# Patient Record
Sex: FEMALE | Race: WHITE | NOT HISPANIC OR LATINO | Employment: FULL TIME | URBAN - METROPOLITAN AREA
[De-identification: names, ages, dates, MRNs, and addresses within clinical notes are randomized per-mention and may not be internally consistent; named-entity substitution may affect disease eponyms.]

---

## 2020-08-20 ENCOUNTER — OFFICE VISIT (OUTPATIENT)
Dept: OBGYN CLINIC | Facility: MEDICAL CENTER | Age: 50
End: 2020-08-20
Payer: COMMERCIAL

## 2020-08-20 VITALS
DIASTOLIC BLOOD PRESSURE: 85 MMHG | HEIGHT: 64 IN | BODY MASS INDEX: 38.76 KG/M2 | WEIGHT: 227 LBS | HEART RATE: 51 BPM | SYSTOLIC BLOOD PRESSURE: 125 MMHG

## 2020-08-20 DIAGNOSIS — M25.561 ACUTE PAIN OF RIGHT KNEE: Primary | ICD-10-CM

## 2020-08-20 DIAGNOSIS — M76.51 PATELLAR TENDINITIS, RIGHT KNEE: ICD-10-CM

## 2020-08-20 PROBLEM — G89.29 CHRONIC PAIN OF RIGHT KNEE: Status: ACTIVE | Noted: 2020-08-20

## 2020-08-20 PROCEDURE — 99203 OFFICE O/P NEW LOW 30 MIN: CPT | Performed by: PHYSICAL MEDICINE & REHABILITATION

## 2020-08-20 RX ORDER — BENAZEPRIL HYDROCHLORIDE 40 MG/1
40 TABLET, FILM COATED ORAL DAILY
COMMUNITY

## 2020-08-20 RX ORDER — SERTRALINE HYDROCHLORIDE 100 MG/1
100 TABLET, FILM COATED ORAL DAILY
COMMUNITY

## 2020-08-20 RX ORDER — MULTIVITAMIN
1 TABLET ORAL DAILY
COMMUNITY

## 2020-08-20 NOTE — PATIENT INSTRUCTIONS
You can obtain and use Voltaren (diclofenac) cream for your discomfort  This is now available over the counter at [employer] and online at [place]  You can use this up to four times per day  It is best to wash the area with water and then pat dry  The cream absorbs better after this  Be careful not to let any pets or children get in contact with the cream as it is a medication  If you develop a skin reaction, stop using the cream     You will be starting physical therapy  It is important to do a home exercise program as you go through PT to speed up your recovery  Rehab addresses the problems that are causing your pain, instead of covering them up, as some other treatment options do  If your pain worsens/does not improve with rehab, we can see you back earlier than planned  Please call our office if that is the case  Otherwise, we will see you on your follow up as scheduled

## 2020-08-20 NOTE — PROGRESS NOTES
1  Acute pain of right knee     2  Patellar tendinitis, right knee       No orders of the defined types were placed in this encounter  Imaging Studies (I personally reviewed images in PACS and report):  Right knee x-rays most recent to this encounter reviewed  These images show medial joint space narrowing with subchondral sclerosis  There is also osteophytosis seen on lateral view in the posterior knee which likely represents a flabella and osteophytes versus an old avulsion injury  Impression:  Right knee pain likely secondary to patellar tendinitis  The patient has full range of motion with extension but limited slightly with flexion due to discomfort  She does not have an extensor lag at all  There is no sunrise view on her x-rays but no fracture of the patella seen  She does have radio-opacities seen in the posterior aspect of her knee on the lateral view and on 1 of the oblique views  This is most likely a flabella along with ossicles versus old avulsion injuries  Additionally, she does not have any posterior knee symptoms or pain on palpation  We provided her with a counterforce brace for the patellar tendon  She will also obtain Voltaren gel over-the-counter  She will start physical therapy  I will see her back in 3 weeks or earlier to reassess  If no improvement, advanced imaging would be warranted  Return in about 3 weeks (around 9/10/2020)  HPI:  Janelle العلي is a 45 y o  female  who presents for evaluation of   Chief Complaint   Patient presents with    Right Knee - Pain       Onset/Mechanism: Pain started two weeks ago while she was standing she felt like a rubber band ripped  Location: Pain is along the top of the knee and radiates downwards  Radiation: As above  Quality: Aching sensation and sharp if standing very long  Provocative: Bending the knee and standing  Severity: Since the pain started, it has been getting worse    Associated Symptoms: She thinks she might have had swelling but not sure  Treatment so far: No treatment  Review of Systems   Constitutional: Positive for activity change  Negative for fever  HENT: Negative for sore throat  Eyes: Negative for visual disturbance  Respiratory: Negative for shortness of breath  Cardiovascular: Negative for chest pain  Gastrointestinal: Negative for abdominal pain  Endocrine: Negative for polydipsia  Genitourinary: Negative for difficulty urinating  Musculoskeletal: Positive for arthralgias  Skin: Negative for rash  Allergic/Immunologic: Negative for immunocompromised state  Neurological: Negative for numbness  Hematological: Does not bruise/bleed easily  Psychiatric/Behavioral: Negative for confusion  Following history reviewed and updated:  Past Medical History:   Diagnosis Date    Hypertension      Past Surgical History:   Procedure Laterality Date    BARIATRIC SURGERY       Social History   Social History     Substance and Sexual Activity   Alcohol Use Yes    Comment: Rarely     Social History     Substance and Sexual Activity   Drug Use Never     Social History     Tobacco Use   Smoking Status Never Smoker   Smokeless Tobacco Never Used     Family History   Problem Relation Age of Onset    Diabetes Brother      No Known Allergies     Constitutional:  /85   Pulse (!) 51   Ht 5' 4" (1 626 m)   Wt 103 kg (227 lb)   BMI 38 96 kg/m²    General: NAD  Eyes: Anicteric sclerae  Neck: Supple  Lungs: Unlabored breathing  Cardiovascular: No lower extremity edema  Skin: Intact without erythema  Neurologic: Sensation intact to light touch  Psychiatric: Mood and affect are appropriate  Right Knee Exam     Muscle Strength   The patient has normal right knee strength  Tenderness   The patient is experiencing tenderness in the patellar tendon  Range of Motion   Extension: normal   Flexion: abnormal Right knee flexion: Pain along the anterior knee at end range of flexion  Tests   Varus: negative Valgus: negative  Drawer:  Anterior - negative    Posterior - negative    Other   Erythema: absent  Scars: absent  Sensation: normal  Pulse: present  Swelling: none  Effusion: no effusion present             Procedures - none for this visit  Portions of the record may have been created with voice recognition software  Occasional wrong word or "sound a like" substitutions may have occurred due to the inherent limitations of voice recognition software  Read the chart carefully and recognize, using context, where substitutions have occurred

## 2020-08-31 ENCOUNTER — EVALUATION (OUTPATIENT)
Dept: PHYSICAL THERAPY | Facility: MEDICAL CENTER | Age: 50
End: 2020-08-31
Payer: COMMERCIAL

## 2020-08-31 DIAGNOSIS — M76.51 PATELLAR TENDINITIS, RIGHT KNEE: ICD-10-CM

## 2020-08-31 DIAGNOSIS — G89.29 CHRONIC PAIN OF RIGHT KNEE: Primary | ICD-10-CM

## 2020-08-31 DIAGNOSIS — M25.561 CHRONIC PAIN OF RIGHT KNEE: Primary | ICD-10-CM

## 2020-08-31 PROCEDURE — 97161 PT EVAL LOW COMPLEX 20 MIN: CPT

## 2020-08-31 NOTE — PROGRESS NOTES
PT Evaluation     Today's date: 2020  Patient name: Amauri Graham  : 1970  MRN: [id number]  Referring provider: Clayton Azar DO  Dx:   Encounter Diagnosis     ICD-10-CM    1  Chronic pain of right knee  M25 561 PT plan of care cert/re-cert    T87 26 PT plan of care cert/re-cert   2  Patellar tendinitis, right knee  M76 51 PT plan of care cert/re-cert     PT plan of care cert/re-cert       Start Time: 1438  Stop Time: 1520  Total time in clinic (min): 42 minutes    Assessment  Assessment details: Amauri Graham is a 44 y/o female who presents to OP PT with a referral from Dr Rabia Larose with a medical diagnosis of acute pain of R knee, patellar tendinitis  Upon examination patient presents with painful ROM of the R knee, wih limited R knee flexion  Pt presents with decreased B LE strength with deficits being greater in R vs L  Patient also presents with TTP of R patella ligament  Patient has decreased balance and functional mobility as well as increased pain at this time  Previously mentioned deficits have impaired patients ability to perform ADLs, recreational activities and work duties  Pt was educated on examination findings, diagnosis, prognosis, role of skilled PT services in the rehabilitation process and importance of HEP compliance for long term success  Pt states understanding and consents to skilled PT services  Pt is a good candidate for skilled pt services  Positive prognostic indicators include desire to improve and active job  Negative prognostic indicators include PMH and BMI at this time  Pt would benefit from skilled PT services to address functional deficits and return to PLOF    Impairments: abnormal gait, abnormal muscle firing, abnormal or restricted ROM, abnormal movement, activity intolerance, impaired balance, impaired physical strength, lacks appropriate home exercise program and pain with function    Symptom irritability: moderateBarriers to therapy: copay  Understanding of Dx/Px/POC: good   Prognosis: good    Goals  STG     1  Patient will demonstrate increased hip abduction strength to 4+/5 to improve gait mechanics in midstance and pre-swing stages    2  Patient will be able to perform deep squat and return to standing without pain to fulfill requirements as   3     Patient will report a 2-3 point decrease on NPRS for improved activity tolerance, performance of ADLs and recreational activities  LTG    1  Patient will be independent with HEP for continued progress  2      Patient will demonstrate 5/5 in all R Knee MMT to improve tolerance for performing work and recreational activities and allow for return to prolonged community ambulation    3  Patient will attain FOTO score of 57 or greater at time of discharge  4      Patient will return to PLOF in performance of all ADLs, recreational activities and work duties  Plan  Patient would benefit from: skilled physical therapy  Planned modality interventions: cryotherapy and TENS  Planned therapy interventions: joint mobilization, manual therapy, massage, ADL training, balance, neuromuscular re-education, patient education, strengthening, stretching, therapeutic activities, therapeutic exercise, flexibility, functional ROM exercises, gait training, graded exercise, home exercise program and Terrell taping  Frequency: 2x week  Plan of Care beginning date: 8/31/2020  Plan of Care expiration date: 10/12/2020  Treatment plan discussed with: patient        Subjective Evaluation    History of Present Illness  Mechanism of injury: Subjective: Patient states she has had a chronic history of B knee pain  She states since bariatric surgery knee pain  Patient states a few weeks ago at work, she turned to change directions, it "felt like a rubber band snapped in my knee " It has not changed in symptoms  Pt states foot was planted and she attempted to pivot when she felt the pain   Pt states painful clicking since initial injury  Pt states clicking occurs when moving from flexed to extended positions  Pt states pain feels like it is "deep in the joint "    Pain  Location: R knee,   Type: ache-after sitting, random-sharp shooting  Current: 5/10  Best: 5/10  Worst: 10/10  Alleviates: ice, voltaren  Aggravates: sitting for greater than 30 minutes, standing and walking    Occupation:     Imaging: outside imaging    PMH: bariatric surgery    Previous Surgeries: bariatric surgery, kidney stone    Previous Physical Activity: "work, 10-15k steps"     Current Medications:  addiiton of voltaren gel(1-2x a day)    MATTHEW: pivot/twisting    Surgery Date: n/a     MD:Dr Cl Nuñez    Patient Goals: pain to stop               Objective     Palpation     Right   Tenderness of the lateral gastrocnemius, medial gastrocnemius, rectus femoris, vastus lateralis and vastus medialis  Tenderness     Right Knee   Tenderness in the inferior fat pad, inferior patella, lateral joint line, medial joint line, patellar tendon, quadriceps tendon and superior patella  Active Range of Motion   Left Knee   Normal active range of motion    Right Knee   Flexion: 118 degrees with pain  Extension: 0 degrees     Passive Range of Motion     Right Knee   Flexion: 125 degrees with pain  Extension: 0 degrees     Mobility   Patellar Mobility:     Right Knee   Hypomobile: medial, lateral, superior and inferior     Strength/Myotome Testing     Left Hip   Planes of Motion   Flexion: 4  Extension: 4  Abduction: 4  External rotation: 4  Internal rotation: 4    Right Hip   Planes of Motion   Flexion: 4  Extension: 4  Abduction: 4  External rotation: 4  Internal rotation: 4    Left Knee   Flexion: 4  Extension: 4    Right Knee   Flexion: 4-  Extension: 4-    Additional Strength Details  R sided MMT is painful    Tests     Right Knee   Positive patellar apprehension  Negative lateral Vaibhav and medial Vaibhav       Additional Tests Details  Pt unable to maintain SLS greater than 5 seconds      Flowsheet Rows      Most Recent Value   PT/OT G-Codes   Current Score  31   Projected Score  57   FOTO information reviewed  Yes   Assessment Type  Evaluation   G code set  Other PT/OT Primary   Other PT Primary Current Status ()  CL   Other PT Primary Goal Status ()  CK             Precautions: OA      Manuals 08/31            STM-crossfricition                                                    Neuro Re-Ed             [employer] set 3"x10            SAQ 3"x10            Glute Bridge 3"x10            LAQ             Clam Shells                                       Ther Ex             SLR x3  3"x10 ea                                                                                                       Ther Activity             Lat Band Walk                          Gait Training                                       Modalities

## 2020-09-03 ENCOUNTER — OFFICE VISIT (OUTPATIENT)
Dept: PHYSICAL THERAPY | Facility: MEDICAL CENTER | Age: 50
End: 2020-09-03
Payer: COMMERCIAL

## 2020-09-03 DIAGNOSIS — G89.29 CHRONIC PAIN OF RIGHT KNEE: Primary | ICD-10-CM

## 2020-09-03 DIAGNOSIS — M25.561 CHRONIC PAIN OF RIGHT KNEE: Primary | ICD-10-CM

## 2020-09-03 DIAGNOSIS — M76.51 PATELLAR TENDINITIS, RIGHT KNEE: ICD-10-CM

## 2020-09-03 PROCEDURE — 97110 THERAPEUTIC EXERCISES: CPT

## 2020-09-03 NOTE — PROGRESS NOTES
Daily Note     Today's date: 9/3/2020  Patient name: Lily Weller  : 1970  MRN: [id number]  Referring provider: Terry Corbin DO  Dx:   Encounter Diagnosis     ICD-10-CM    1  Chronic pain of right knee  M25 561     G89 29    2  Patellar tendinitis, right knee  M76 51        Start Time: 828  Stop Time: 858  Total time in clinic (min): 30 minutes    Subjective: Pt reports slight increase in general soreness since initial evaluation  Pain currently rated as a 5/10  Objective: See treatment diary below      Assessment: Tolerated treatment well  Patient with mild relief with STM this session  Pt require verbal cues for improved eccentric control throughout exercise  Patient demonstrated fatigue post treatment and would benefit from continued PT      Plan: Continue per plan of care  Progress treatment as tolerated         Precautions: OA      Manuals            STM-crossfriciton  5'                                                  Neuro Re-Ed             [employer] set 3"x10 3"x10           SAQ 3"x10 3"x10           Glute Bridge 3"x10 3"x10           LAQ  3"x10           Clam Shells  3"x20ea RTB                                     Ther Ex             SLR x3  3"x10 ea 3"x10 ea           PB Wall Squat  1/4 3"x15                                                                                         Ther Activity             Lat Band Walk                          Gait Training                                       Modalities

## 2020-09-08 ENCOUNTER — APPOINTMENT (OUTPATIENT)
Dept: PHYSICAL THERAPY | Facility: MEDICAL CENTER | Age: 50
End: 2020-09-08
Payer: COMMERCIAL

## 2020-09-10 ENCOUNTER — OFFICE VISIT (OUTPATIENT)
Dept: OBGYN CLINIC | Facility: MEDICAL CENTER | Age: 50
End: 2020-09-10
Payer: COMMERCIAL

## 2020-09-10 ENCOUNTER — APPOINTMENT (OUTPATIENT)
Dept: RADIOLOGY | Facility: MEDICAL CENTER | Age: 50
End: 2020-09-10
Payer: COMMERCIAL

## 2020-09-10 ENCOUNTER — OFFICE VISIT (OUTPATIENT)
Dept: PHYSICAL THERAPY | Facility: MEDICAL CENTER | Age: 50
End: 2020-09-10
Payer: COMMERCIAL

## 2020-09-10 VITALS
DIASTOLIC BLOOD PRESSURE: 83 MMHG | WEIGHT: 225.8 LBS | BODY MASS INDEX: 38.55 KG/M2 | HEIGHT: 64 IN | SYSTOLIC BLOOD PRESSURE: 129 MMHG | HEART RATE: 54 BPM

## 2020-09-10 DIAGNOSIS — M76.51 PATELLAR TENDINITIS, RIGHT KNEE: Primary | ICD-10-CM

## 2020-09-10 DIAGNOSIS — G89.29 CHRONIC PAIN OF RIGHT KNEE: Primary | ICD-10-CM

## 2020-09-10 DIAGNOSIS — M25.561 CHRONIC PAIN OF RIGHT KNEE: Primary | ICD-10-CM

## 2020-09-10 DIAGNOSIS — M76.51 PATELLAR TENDINITIS, RIGHT KNEE: ICD-10-CM

## 2020-09-10 PROCEDURE — 97112 NEUROMUSCULAR REEDUCATION: CPT | Performed by: PHYSICAL THERAPIST

## 2020-09-10 PROCEDURE — 99213 OFFICE O/P EST LOW 20 MIN: CPT | Performed by: PHYSICAL MEDICINE & REHABILITATION

## 2020-09-10 PROCEDURE — 97140 MANUAL THERAPY 1/> REGIONS: CPT | Performed by: PHYSICAL THERAPIST

## 2020-09-10 PROCEDURE — 73560 X-RAY EXAM OF KNEE 1 OR 2: CPT

## 2020-09-10 PROCEDURE — 97110 THERAPEUTIC EXERCISES: CPT | Performed by: PHYSICAL THERAPIST

## 2020-09-10 NOTE — PROGRESS NOTES
Daily Note     Today's date: 9/10/2020  Patient name: Tim Petty  : 1970  MRN: [id number]  Referring provider: Margot Bach DO  Dx:   Encounter Diagnosis     ICD-10-CM    1  Chronic pain of right knee  M25 561     G89 29    2  Patellar tendinitis, right knee  M76 51                   Subjective: Pt reporting pain over last couple days was severe - pt noting pain worsened through quad tendon and and lower leg      Objective: See treatment diary below      Assessment: Tolerated treatment well  Pt reponding well with tibial traction during quad stretch (without pt experiences sharp quad tenon pain), with mob pt was able to feel true quad stretch  Pt may benefit from progressions and development of medial quad/add strengthening/stability as well  Patient demonstrated fatigue post treatment and would benefit from continued PT      Plan: Continue per plan of care  Progress treatment as tolerated         Precautions: OA      Manuals  09 9/10          STM-crossfriciton  5'                                                  Neuro Re-Ed             Quad set 3"x10 3"x10 3"x 10          SAQ 3"x10 3"x10 2x10          Glute Bridge 3"x10 3"x10 2x10          LAQ  3"x10 2x10          Clam Shells  3"x20ea RTB 3"x20ea RTB                                    Ther Ex             SLR x3  3"x10 ea 3"x10 ea 2x10 flex, abd, add          PB Wall Squat  / 3"x15           Ball squeeze   5"x  20                                                                           Ther Activity             Lat Band Walk                          Gait Training                                       Modalities

## 2020-09-10 NOTE — PROGRESS NOTES
1  Patellar tendinitis, right knee  XR knee 1 or 2 vw right     Orders Placed This Encounter   Procedures    XR knee 1 or 2 vw right        Imaging Studies (I personally reviewed images in PACS and report):  Right knee x-rays most recent to this encounter reviewed  These images show medial joint space narrowing with subchondral sclerosis  There is also osteophytosis seen on lateral view in the posterior knee which likely represents a flabella and osteophytes versus an old avulsion injury  We repeated a lateral x-ray of the right knee on 9/10/2020  These images show increased cortication and regularity of the radiodensities but this could be projectional   Possible calcification of the PCL along with a fabella      Impression:  Right knee pain likely secondary to patellar and quadriceps tendinitis  Patient presents in follow-up after a couple of sessions of physical therapy  She has also been using Voltaren gel and using the patellar tendon strap  Overall, her symptoms are improving  We obtained lateral and sunrise views of her knee today as above  She will continue with physical therapy  She continues to have no posterior knee pain  I will see her back in about 2-3 weeks  She does not have any knee instability or positive posterior drawer test today  If on follow-up visit, she continues to have knee pain, we will obtain an MRI of her knee  Return in about 3 weeks (around 10/1/2020)  HPI:  Precious Kolb is a 45 y o  female  who presents in follow up  Here for   Chief Complaint   Patient presents with    Right Knee - Follow-up       Date of injury:  Pain started in early August 2020  Trajectory of symptoms:  Feeling improved and now has more pain along the distal quadriceps  Feels that physical therapy, the patellar tendon strap and Voltaren gel have been helpful  She does not have any posterior knee pain  She denies pain at night  She denies any unintentional weight changes      Review of Systems   Constitutional: Negative for activity change, fatigue and fever  HENT: Negative for sore throat  Eyes: Negative for visual disturbance  Respiratory: Negative for shortness of breath  Cardiovascular: Negative for chest pain  Gastrointestinal: Negative for abdominal pain  Endocrine: Negative for polydipsia  Genitourinary: Negative for difficulty urinating  Musculoskeletal: Positive for arthralgias  Skin: Negative for rash  Allergic/Immunologic: Negative for immunocompromised state  Neurological: Negative for weakness and numbness  Hematological: Does not bruise/bleed easily  Psychiatric/Behavioral: Negative for confusion  Following history reviewed and updated:  Past Medical History:   Diagnosis Date    Hypertension      Past Surgical History:   Procedure Laterality Date    BARIATRIC SURGERY       Social History   Social History     Substance and Sexual Activity   Alcohol Use Yes    Comment: Rarely     Social History     Substance and Sexual Activity   Drug Use Never     Social History     Tobacco Use   Smoking Status Never Smoker   Smokeless Tobacco Never Used     Family History   Problem Relation Age of Onset    Diabetes Brother      No Known Allergies     Constitutional:  /83   Pulse (!) 54   Ht 5' 4" (1 626 m)   Wt 102 kg (225 lb 12 8 oz)   BMI 38 76 kg/m²    General: NAD  Eyes: Clear sclerae  ENT: No inflammation, lesion, or mass of lips  No tracheal deviation  Musculoskeletal: As mentioned below  Integumentary: No visible rashes or skin lesions  Pulmonary/Chest: Effort normal  No respiratory distress  Neuro: CN's grossly intact, AVILA  Psych: Normal affect and judgement  Vascular: WWP  Right Knee Exam     Tenderness   The patient is experiencing tenderness in the patellar tendon (Tender along the distal quadriceps)      Range of Motion   Extension: normal   Flexion: abnormal     Tests   Varus: negative Valgus: negative    Other   Erythema: absent  Scars: absent  Sensation: normal  Pulse: present  Swelling: none  Effusion: no effusion present    Comments:  No tenderness in the posterior knee  Procedures - none for this visit  Portions of the record may have been created with voice recognition software  Occasional wrong word or "sound a like" substitutions may have occurred due to the inherent limitations of voice recognition software  Read the chart carefully and recognize, using context, where substitutions have occurred

## 2020-09-14 ENCOUNTER — OFFICE VISIT (OUTPATIENT)
Dept: PHYSICAL THERAPY | Facility: MEDICAL CENTER | Age: 50
End: 2020-09-14
Payer: COMMERCIAL

## 2020-09-14 DIAGNOSIS — G89.29 CHRONIC PAIN OF RIGHT KNEE: Primary | ICD-10-CM

## 2020-09-14 DIAGNOSIS — M25.561 CHRONIC PAIN OF RIGHT KNEE: Primary | ICD-10-CM

## 2020-09-14 DIAGNOSIS — M76.51 PATELLAR TENDINITIS, RIGHT KNEE: ICD-10-CM

## 2020-09-14 PROCEDURE — 97110 THERAPEUTIC EXERCISES: CPT

## 2020-09-14 NOTE — PROGRESS NOTES
Daily Note     Today's date: 2020  Patient name: Dany Rossi  : 1970  MRN: [id number]  Referring provider: Michelle Higuera DO  Dx:   Encounter Diagnosis     ICD-10-CM    1  Chronic pain of right knee  M25 561     G89 29    2  Patellar tendinitis, right knee  M76 51        Start Time: 1345  Stop Time: 1415  Total time in clinic (min): 30 minutes    Subjective: Pt states no significant change in function  She states she has noticed an increase in clicking in R knee, however denies pain with clicking  She states she had moderate relief after previous session  Pain is currently a 3/10  Objective: See treatment diary below      Assessment: Tolerated treatment well  Patient tolerates exercise well, minimial c/o of discomfort performing lateral step ups and step ups this session  Patient demonstrated fatigue post treatment and would benefit from continued PT      Plan: Continue per plan of care  Progress treatment as tolerated         Precautions: OA      Manuals 08/31 09/03 9/10 09/14        STM-crossfriciton  5'  Quad stretch with tibial traction 5'                                            Neuro Re-Ed            Quad set 3"x10 3"x10 3"x 10 3"x 10        SAQ 3"x10 3"x10 2x10 2x10 3"        Glute Bridge 3"x10 3"x10 2x10 2x10        LAQ  3"x10 2x10 2x10 3"        Clam Shells  3"x20ea RTB 3"x20ea RTB 3"x20ea GTB        Step Up    6" 2x10        Lat Step Up    6" 2x10        Ther Ex            SLR x3  3"x10 ea 3"x10 ea 2x10 flex, abd, add 2x10 flex, abd, add        PB Wall Squat  1/4 3"x15  LP LV 18 PB 2x10        Ball squeeze   5"x  20 5"x  20                                                                    Ther Activity            Lat Band Walk    RTB 10'x5                    Gait Training                                    Modalities

## 2020-09-17 ENCOUNTER — OFFICE VISIT (OUTPATIENT)
Dept: PHYSICAL THERAPY | Facility: MEDICAL CENTER | Age: 50
End: 2020-09-17
Payer: COMMERCIAL

## 2020-09-17 DIAGNOSIS — M76.51 PATELLAR TENDINITIS, RIGHT KNEE: ICD-10-CM

## 2020-09-17 DIAGNOSIS — M25.561 CHRONIC PAIN OF RIGHT KNEE: Primary | ICD-10-CM

## 2020-09-17 DIAGNOSIS — G89.29 CHRONIC PAIN OF RIGHT KNEE: Primary | ICD-10-CM

## 2020-09-17 PROCEDURE — 97110 THERAPEUTIC EXERCISES: CPT

## 2020-09-17 NOTE — PROGRESS NOTES
Daily Note     Today's date: 2020  Patient name: Tosha Severino  : 1970  MRN: [id number]  Referring provider: Luther Daley DO  Dx:   Encounter Diagnosis     ICD-10-CM    1  Chronic pain of right knee  M25 561     G89 29    2  Patellar tendinitis, right knee  M76 51        Start Time: 0900  Stop Time: 0930  Total time in clinic (min): 30 minutes    Subjective: Pt states "some days are better than others, today is not a good day "      Objective: See treatment diary below      Assessment: Tolerated treatment well  Patient requires min verbal cues and demonstration in performance of heel elevated squat  Patient demonstrated fatigue post treatment and would benefit from continued PT      Plan: Continue per plan of care  Progress treatment as tolerated         Precautions: OA      Manuals 08/31 09/03 9/10 09/14 09/17       STM-crossfriciton  5'  Quad stretch with tibial traction 5' Quad and patella tendon 10'                                           Neuro Re-Ed            Quad set 3"x10 3"x10 3"x 10 3"x 10 HEP       SAQ 3"x10 3"x10 2x10 2x10 3" HEP       Glute Bridge 3"x10 3"x10 2x10 2x10 2x10       LAQ  3"x10 2x10 2x10 3" 2x10 3" 3#       Clam Shells  3"x20ea RTB 3"x20ea RTB 3"x20ea GTB 3"x20ea GTB       Step Up    6" 2x10        Lat Step Up    6" 2x10        Ther Ex            SLR x3  3"x10 ea 3"x10 ea 2x10 flex, abd, add 2x10 flex, abd, add hold       PB Wall Squat  1/4 3"x15  LP LV 18 PB 2x10 LP LV 18 PB 2x10       Ball squeeze   5"x  20 5"x  20 5"x  20       Heel Elevated Squat     2x10                                                        Ther Activity            Lat Band Walk    RTB 10'x5                    Gait Training                                    Modalities

## 2020-09-21 ENCOUNTER — OFFICE VISIT (OUTPATIENT)
Dept: PHYSICAL THERAPY | Facility: MEDICAL CENTER | Age: 50
End: 2020-09-21
Payer: COMMERCIAL

## 2020-09-21 DIAGNOSIS — M25.561 CHRONIC PAIN OF RIGHT KNEE: Primary | ICD-10-CM

## 2020-09-21 DIAGNOSIS — G89.29 CHRONIC PAIN OF RIGHT KNEE: Primary | ICD-10-CM

## 2020-09-21 DIAGNOSIS — M76.51 PATELLAR TENDINITIS, RIGHT KNEE: ICD-10-CM

## 2020-09-21 PROCEDURE — 97110 THERAPEUTIC EXERCISES: CPT

## 2020-09-21 NOTE — PROGRESS NOTES
Daily Note     Today's date: 2020  Patient name: Dean Castro  : 1970  MRN: [id number]  Referring provider: Rafaela Morton DO  Dx:   Encounter Diagnosis     ICD-10-CM    1  Chronic pain of right knee  M25 561     G89 29    2  Patellar tendinitis, right knee  M76 51        Start Time: 1115  Stop Time: 1140  Total time in clinic (min): 25 minutes    Subjective: Pt reports feeling well overall since previous session  She currently denies pain  Objective: See treatment diary below      Assessment: Tolerated treatment well  Patient continues to require verbal cues for improved performance of heel elevated squat  With cues patient has moderate carryover  Patient demonstrated fatigue post treatment and would benefit from continued PT      Plan: Continue per plan of care  Progress treatment as tolerated         Precautions: OA,       Manuals 08/31 09/03 9/10 09/14 09/17       STM-crossfriciton  5'  Quad stretch with tibial traction 5' Quad and patella tendon 10'                                           Neuro Re-Ed            Quad set 3"x10 3"x10 3"x 10 3"x 10 HEP       SAQ 3"x10 3"x10 2x10 2x10 3" HEP       Glute Bridge 3"x10 3"x10 2x10 2x10 2x10       LAQ  3"x10 2x10 2x10 3" 2x10 3" 3#       Clam Shells  3"x20ea RTB 3"x20ea RTB 3"x20ea GTB 3"x20ea GTB       Step Up    6" 2x10        Lat Step Up    6" 2x10        Ther Ex            SLR x3  3"x10 ea 3"x10 ea 2x10 flex, abd, add 2x10 flex, abd, add hold hold      PB Wall Squat  1/4 3"x15  LP LV 18 PB 2x10 LP LV 18 PB 2x10 LP LV 18 PB 2x10      Ball squeeze   5"x  20 5"x  20 5"x  20 5"x  20      Heel Elevated Squat     2x10                                                        Ther Activity            Lat Band Walk    RTB 10'x5                    Gait Training                                    Modalities

## 2020-09-24 ENCOUNTER — OFFICE VISIT (OUTPATIENT)
Dept: PHYSICAL THERAPY | Facility: MEDICAL CENTER | Age: 50
End: 2020-09-24
Payer: COMMERCIAL

## 2020-09-24 DIAGNOSIS — G89.29 CHRONIC PAIN OF RIGHT KNEE: Primary | ICD-10-CM

## 2020-09-24 DIAGNOSIS — M76.51 PATELLAR TENDINITIS, RIGHT KNEE: ICD-10-CM

## 2020-09-24 DIAGNOSIS — M25.561 CHRONIC PAIN OF RIGHT KNEE: Primary | ICD-10-CM

## 2020-09-24 PROCEDURE — 97110 THERAPEUTIC EXERCISES: CPT | Performed by: PHYSICAL MEDICINE & REHABILITATION

## 2020-09-24 PROCEDURE — 97112 NEUROMUSCULAR REEDUCATION: CPT | Performed by: PHYSICAL MEDICINE & REHABILITATION

## 2020-09-24 PROCEDURE — 97140 MANUAL THERAPY 1/> REGIONS: CPT | Performed by: PHYSICAL MEDICINE & REHABILITATION

## 2020-09-24 NOTE — PROGRESS NOTES
Daily Note     Today's date: 2020  Patient name: Beryle Row  : 1970  MRN: [id number]  Referring provider: Nolan Monaco DO  Dx:   Encounter Diagnosis     ICD-10-CM    1  Chronic pain of right knee  M25 561     G89 29    2  Patellar tendinitis, right knee  M76 51                   Subjective: Patient offers no new complaints, notes decreased intensity of pain at end of day yesterday following increased ambulation  Objective: See treatment diary below      Assessment: Tolerated treatment well  Intermittent cueing for form maintenance with good carryover  Provided GTB for home use during clamshells  Patient demonstrated fatigue post treatment and would benefit from continued PT  Plan: Continue per plan of care  Progress treatment as tolerated         Precautions: OA,       Manuals 08/31 09/03 9/10 09/14 09/17 9/21 9/24     STM-crossfriciton  5'  Quad stretch with tibial traction 5' Quad and patella tendon 10'  LH                                         Neuro Re-Ed            Quad set 3"x10 3"x10 3"x 10 3"x 10 HEP       SAQ 3"x10 3"x10 2x10 2x10 3" HEP       Glute Bridge 3"x10 3"x10 2x10 2x10 2x10  2x10x5"     LAQ  3"x10 2x10 2x10 3" 2x10 3" 3#  2x10, 3#     Clam Shells  3"x20ea RTB 3"x20ea RTB 3"x20ea GTB 3"x20ea GTB  20x3" ea     Step Up    6" 2x10        Lat Step Up    6" 2x10        Ther Ex            SLR x3  3"x10 ea 3"x10 ea 2x10 flex, abd, add 2x10 flex, abd, add hold hold np     PB Wall Squat  1/4 3"x15  LP LV 18 PB 2x10 LP LV 18 PB 2x10 LP LV 18 PB 2x10 LP L18 3x10     Ball squeeze   5"x  20 5"x  20 5"x  20 5"x  20 20x5"     Heel Elevated Squat     2x10                                                        Ther Activity            Lat Band Walk    RTB 10'x5                    Gait Training                                    Modalities

## 2020-09-28 ENCOUNTER — APPOINTMENT (OUTPATIENT)
Dept: PHYSICAL THERAPY | Facility: MEDICAL CENTER | Age: 50
End: 2020-09-28
Payer: COMMERCIAL

## 2020-10-01 ENCOUNTER — OFFICE VISIT (OUTPATIENT)
Dept: PHYSICAL THERAPY | Facility: MEDICAL CENTER | Age: 50
End: 2020-10-01
Payer: COMMERCIAL

## 2020-10-01 ENCOUNTER — OFFICE VISIT (OUTPATIENT)
Dept: OBGYN CLINIC | Facility: MEDICAL CENTER | Age: 50
End: 2020-10-01
Payer: COMMERCIAL

## 2020-10-01 VITALS
HEART RATE: 51 BPM | DIASTOLIC BLOOD PRESSURE: 86 MMHG | SYSTOLIC BLOOD PRESSURE: 136 MMHG | WEIGHT: 229.4 LBS | BODY MASS INDEX: 39.16 KG/M2 | HEIGHT: 64 IN

## 2020-10-01 DIAGNOSIS — M76.51 PATELLAR TENDINITIS, RIGHT KNEE: ICD-10-CM

## 2020-10-01 DIAGNOSIS — M25.561 CHRONIC PAIN OF RIGHT KNEE: Primary | ICD-10-CM

## 2020-10-01 DIAGNOSIS — M22.2X1 PATELLOFEMORAL PAIN SYNDROME OF RIGHT KNEE: Primary | ICD-10-CM

## 2020-10-01 DIAGNOSIS — M25.561 CHRONIC PAIN OF RIGHT KNEE: ICD-10-CM

## 2020-10-01 DIAGNOSIS — G89.29 CHRONIC PAIN OF RIGHT KNEE: Primary | ICD-10-CM

## 2020-10-01 DIAGNOSIS — G89.29 CHRONIC PAIN OF RIGHT KNEE: ICD-10-CM

## 2020-10-01 PROCEDURE — 99213 OFFICE O/P EST LOW 20 MIN: CPT | Performed by: PHYSICAL MEDICINE & REHABILITATION

## 2020-10-01 PROCEDURE — 97110 THERAPEUTIC EXERCISES: CPT

## 2020-10-05 ENCOUNTER — APPOINTMENT (OUTPATIENT)
Dept: PHYSICAL THERAPY | Facility: MEDICAL CENTER | Age: 50
End: 2020-10-05
Payer: COMMERCIAL

## 2020-10-08 ENCOUNTER — OFFICE VISIT (OUTPATIENT)
Dept: PHYSICAL THERAPY | Facility: MEDICAL CENTER | Age: 50
End: 2020-10-08
Payer: COMMERCIAL

## 2020-10-08 DIAGNOSIS — M76.51 PATELLAR TENDINITIS, RIGHT KNEE: ICD-10-CM

## 2020-10-08 DIAGNOSIS — M25.561 CHRONIC PAIN OF RIGHT KNEE: Primary | ICD-10-CM

## 2020-10-08 DIAGNOSIS — G89.29 CHRONIC PAIN OF RIGHT KNEE: Primary | ICD-10-CM

## 2020-10-08 PROCEDURE — 97110 THERAPEUTIC EXERCISES: CPT

## 2020-10-15 ENCOUNTER — OFFICE VISIT (OUTPATIENT)
Dept: PHYSICAL THERAPY | Facility: MEDICAL CENTER | Age: 50
End: 2020-10-15
Payer: COMMERCIAL

## 2020-10-15 DIAGNOSIS — M25.561 CHRONIC PAIN OF RIGHT KNEE: Primary | ICD-10-CM

## 2020-10-15 DIAGNOSIS — M76.51 PATELLAR TENDINITIS, RIGHT KNEE: ICD-10-CM

## 2020-10-15 DIAGNOSIS — G89.29 CHRONIC PAIN OF RIGHT KNEE: Primary | ICD-10-CM

## 2020-10-15 PROCEDURE — 97110 THERAPEUTIC EXERCISES: CPT

## 2020-10-19 ENCOUNTER — APPOINTMENT (OUTPATIENT)
Dept: PHYSICAL THERAPY | Facility: MEDICAL CENTER | Age: 50
End: 2020-10-19
Payer: COMMERCIAL

## 2020-10-22 ENCOUNTER — APPOINTMENT (OUTPATIENT)
Dept: PHYSICAL THERAPY | Facility: MEDICAL CENTER | Age: 50
End: 2020-10-22
Payer: COMMERCIAL

## 2020-10-26 ENCOUNTER — APPOINTMENT (OUTPATIENT)
Dept: PHYSICAL THERAPY | Facility: MEDICAL CENTER | Age: 50
End: 2020-10-26
Payer: COMMERCIAL

## 2021-03-10 DIAGNOSIS — Z23 ENCOUNTER FOR IMMUNIZATION: ICD-10-CM

## 2021-03-19 ENCOUNTER — IMMUNIZATIONS (OUTPATIENT)
Dept: FAMILY MEDICINE CLINIC | Facility: HOSPITAL | Age: 51
End: 2021-03-19

## 2021-03-19 DIAGNOSIS — Z23 ENCOUNTER FOR IMMUNIZATION: Primary | ICD-10-CM

## 2021-03-19 PROCEDURE — 91300 SARS-COV-2 / COVID-19 MRNA VACCINE (PFIZER-BIONTECH) 30 MCG: CPT

## 2021-03-19 PROCEDURE — 0001A SARS-COV-2 / COVID-19 MRNA VACCINE (PFIZER-BIONTECH) 30 MCG: CPT

## 2021-04-14 ENCOUNTER — IMMUNIZATIONS (OUTPATIENT)
Dept: FAMILY MEDICINE CLINIC | Facility: HOSPITAL | Age: 51
End: 2021-04-14

## 2021-04-14 DIAGNOSIS — Z23 ENCOUNTER FOR IMMUNIZATION: Primary | ICD-10-CM

## 2021-04-14 PROCEDURE — 0002A SARS-COV-2 / COVID-19 MRNA VACCINE (PFIZER-BIONTECH) 30 MCG: CPT

## 2021-04-14 PROCEDURE — 91300 SARS-COV-2 / COVID-19 MRNA VACCINE (PFIZER-BIONTECH) 30 MCG: CPT
